# Patient Record
Sex: FEMALE | Race: WHITE | NOT HISPANIC OR LATINO | Employment: OTHER | URBAN - METROPOLITAN AREA
[De-identification: names, ages, dates, MRNs, and addresses within clinical notes are randomized per-mention and may not be internally consistent; named-entity substitution may affect disease eponyms.]

---

## 2017-02-03 ENCOUNTER — CONVERSION ENCOUNTER (OUTPATIENT)
Dept: FAMILY MEDICINE CLINIC | Facility: CLINIC | Age: 82
End: 2017-02-03

## 2017-09-01 ENCOUNTER — CONVERSION ENCOUNTER (OUTPATIENT)
Dept: FAMILY MEDICINE CLINIC | Facility: CLINIC | Age: 82
End: 2017-09-01

## 2018-03-02 ENCOUNTER — CONVERSION ENCOUNTER (OUTPATIENT)
Dept: FAMILY MEDICINE CLINIC | Facility: CLINIC | Age: 83
End: 2018-03-02

## 2018-08-31 ENCOUNTER — CONVERSION ENCOUNTER (OUTPATIENT)
Dept: FAMILY MEDICINE CLINIC | Facility: CLINIC | Age: 83
End: 2018-08-31

## 2019-03-01 ENCOUNTER — CONVERSION ENCOUNTER (OUTPATIENT)
Dept: FAMILY MEDICINE CLINIC | Facility: CLINIC | Age: 84
End: 2019-03-01

## 2019-06-03 VITALS
HEART RATE: 86 BPM | HEIGHT: 60 IN | SYSTOLIC BLOOD PRESSURE: 171 MMHG | SYSTOLIC BLOOD PRESSURE: 182 MMHG | HEART RATE: 61 BPM | WEIGHT: 149 LBS | WEIGHT: 142 LBS | DIASTOLIC BLOOD PRESSURE: 57 MMHG | HEART RATE: 53 BPM | HEIGHT: 60 IN | HEART RATE: 58 BPM | BODY MASS INDEX: 29.25 KG/M2 | DIASTOLIC BLOOD PRESSURE: 80 MMHG | BODY MASS INDEX: 27.88 KG/M2 | WEIGHT: 146 LBS | HEIGHT: 60 IN | DIASTOLIC BLOOD PRESSURE: 81 MMHG | SYSTOLIC BLOOD PRESSURE: 183 MMHG | WEIGHT: 150 LBS | BODY MASS INDEX: 29.45 KG/M2 | BODY MASS INDEX: 28.66 KG/M2 | DIASTOLIC BLOOD PRESSURE: 80 MMHG | DIASTOLIC BLOOD PRESSURE: 78 MMHG | HEIGHT: 60 IN | WEIGHT: 134 LBS | SYSTOLIC BLOOD PRESSURE: 151 MMHG | BODY MASS INDEX: 26.31 KG/M2 | HEART RATE: 51 BPM | HEIGHT: 60 IN | SYSTOLIC BLOOD PRESSURE: 108 MMHG

## 2019-08-26 RX ORDER — AMLODIPINE BESYLATE 5 MG/1
TABLET ORAL
Qty: 60 TABLET | Refills: 4 | Status: SHIPPED | OUTPATIENT
Start: 2019-08-26 | End: 2020-02-26

## 2019-11-14 ENCOUNTER — OFFICE VISIT (OUTPATIENT)
Dept: CARDIOLOGY | Facility: CLINIC | Age: 84
End: 2019-11-14

## 2019-11-14 VITALS
WEIGHT: 134 LBS | BODY MASS INDEX: 26.17 KG/M2 | DIASTOLIC BLOOD PRESSURE: 77 MMHG | SYSTOLIC BLOOD PRESSURE: 151 MMHG | HEART RATE: 66 BPM

## 2019-11-14 DIAGNOSIS — R60.9 EDEMA, UNSPECIFIED TYPE: ICD-10-CM

## 2019-11-14 DIAGNOSIS — N18.30 CHRONIC RENAL INSUFFICIENCY, STAGE III (MODERATE) (HCC): ICD-10-CM

## 2019-11-14 DIAGNOSIS — R06.02 SHORTNESS OF BREATH: ICD-10-CM

## 2019-11-14 DIAGNOSIS — I10 ESSENTIAL HYPERTENSION: Primary | ICD-10-CM

## 2019-11-14 PROCEDURE — 99213 OFFICE O/P EST LOW 20 MIN: CPT | Performed by: NURSE PRACTITIONER

## 2019-11-14 PROCEDURE — 93000 ELECTROCARDIOGRAM COMPLETE: CPT | Performed by: NURSE PRACTITIONER

## 2019-11-14 RX ORDER — MONTELUKAST SODIUM 10 MG/1
TABLET ORAL EVERY 24 HOURS
COMMUNITY

## 2019-11-14 RX ORDER — AMLODIPINE BESYLATE 5 MG/1
TABLET ORAL EVERY 24 HOURS
COMMUNITY
End: 2020-06-24

## 2019-11-14 RX ORDER — CHLORAL HYDRATE 500 MG
CAPSULE ORAL EVERY 24 HOURS
COMMUNITY

## 2019-11-14 RX ORDER — SEVELAMER CARBONATE 800 MG/1
TABLET, FILM COATED ORAL
COMMUNITY

## 2019-11-14 RX ORDER — MELATONIN 10 MG
CAPSULE ORAL
COMMUNITY

## 2019-11-14 RX ORDER — FUROSEMIDE 20 MG/1
5 TABLET ORAL 2 TIMES DAILY
COMMUNITY

## 2019-11-14 RX ORDER — NEBIVOLOL 20 MG/1
TABLET ORAL
COMMUNITY

## 2019-11-14 RX ORDER — GABAPENTIN 800 MG/1
TABLET ORAL EVERY 6 HOURS SCHEDULED
COMMUNITY

## 2019-11-14 NOTE — PROGRESS NOTES
"  [facility] CARDIOLOGY      REASON FOR FOLLOW-UP:  Hypertension  Valvular heart disease      Chief Complaint   Patient presents with   • Hypertension     Routine f/u.  Changing from Dr. Calixto because she doesn't want to go to [place]         Dear Dr. Turcios,    History of Present Illness     It was my pleasure to see Ms. Fajardo in the office today.  As you are aware, she is a very pleasant and highly functioning 84 year-old white female with no known history of occlusive coronary disease.  She does have past medical history significant for hypertension, chronic kidney disease.  In 2014, patient had echocardiogram which showed normal left ventricular size and function and LVEF was 60-65%.  Mild tricuspid and mitral regurgitation was noted. In 2016, patient underwent stress test which was negative for ischemia or myocardial infarction on Cardiolite imaging.  However EKG showed ST depression during pharmacological portion. Medical treatment was recommended.  She has an additional history of chronic kidney disease and is seen by nephrology.  She presents today in follow-up and to establish care in our office.    Today, the patient reports that she feels well.  Specifically, she denies any complaints of chest pain, pressure, tightness or palpitations.  She has occasional shortness of breath with activity or when she \"talks for a long period of time\".  She denies any shortness of air at rest.  She is quite active at her age.  She does have occasional, mild edema of her ankles.  Her blood pressure today is elevated at 151/77, retake 159/75.  The patient states she monitors her pressures at home with consistent systolic readings in the 130s.  She will continue to monitor closely and notify us for any significant changes.         Assessment:  Hypertension  Valvular heart disease  Lupus        Plan:  Continue current medical plan  Continue to monitor pressures at home and notify our office for any " changes  Lipids per your office  Follow-up in 6 months or sooner if needed.        The following portions of the patient's history were reviewed and updated as appropriate: allergies, current medications, past family history, past medical history, past social history, past surgical history and problem list.    REVIEW OF SYSTEMS:    Review of Systems   Cardiovascular: Positive for leg swelling.   Neurological: Positive for dizziness.        History of vertigo   All other systems reviewed and are negative.      Vitals:    19 1313   BP: 151/77   Pulse: 66         PHYSICAL EXAM:    General: Alert, cooperative, no distress, appears stated age  Head:  Normocephalic, atraumatic, mucous membranes moist  Eyes:  Conjunctiva/corneas clear, EOM's intact     Neck:  Supple,  no JVD or bruit     Lungs: Clear to auscultation bilaterally, no wheezes rhonchi rales are noted  Chest wall: No tenderness  Musculoskeletal:   Ambulates freely without assistance  Heart::  Regular rate and rhythm, S1 and S2 normal, no murmur, rub or gallop  Abdomen: Soft, non-tender, nondistended bowel sounds active  Extremities: No cyanosis, clubbing.  1+ edema to ankles   Pulses: 2+ and symmetric all extremities  Skin:  No rashes or lesions  Neuro/psych: A&O x3. CN II through XII are grossly intact with appropriate affect        Past Medical History:   Diagnosis Date   • Hypertension    • Lupus (CMS/HCC)    • Sjogren's syndrome (CMS/HCC)        Past Surgical History:   Procedure Laterality Date   • APPENDECTOMY     • BREAST BIOPSY     •  SECTION     • SINUS SURGERY           Current Outpatient Medications:   •  amLODIPine (NORVASC) 5 MG tablet, TAKE 1 TABLET BY MOUTH TWO TIMES A DAY , Disp: 60 tablet, Rfl: 4  •  amLODIPine (NORVASC) 5 MG tablet, Daily., Disp: , Rfl:   •  furosemide (LASIX) 20 MG tablet, Daily., Disp: , Rfl:   •  gabapentin (NEURONTIN) 800 MG tablet, Every 6 (Six) Hours., Disp: , Rfl:   •  Garlic (GARLIC 1500) 1500 MG capsule,  as directed, Disp: , Rfl:   •  Ginkgo Biloba 400 MG capsule, as directed, Disp: , Rfl:   •  Melatonin 10 MG capsule, 10 capsules throughout the night, Disp: , Rfl:   •  Misc Natural Products (OSTEO BI-FLEX ADV DOUBLE ST PO), as directed, Disp: , Rfl:   •  montelukast (SINGULAIR) 10 MG tablet, Daily., Disp: , Rfl:   •  Multiple Vitamins-Minerals (CENTRUM SILVER ADULT 50+ PO), as directed, Disp: , Rfl:   •  nebivolol (BYSTOLIC) 20 MG tablet, 1 tablet, Disp: , Rfl:   •  Omega-3 Fatty Acids (FISH OIL) 1000 MG capsule capsule, Daily., Disp: , Rfl:   •  sevelamer (RENVELA) 800 MG tablet, TAKE 1 TABLET BY MOUTH THREE TIMES A DAY WITH MEALS, Disp: , Rfl:   •  SUPER B COMPLEX/C PO, as directed, Disp: , Rfl:     Allergies   Allergen Reactions   • Penicillins Swelling and Unknown (See Comments)   • Mushroom Cough       History reviewed. No pertinent family history.    Social History     Tobacco Use   • Smoking status: Never Smoker   • Smokeless tobacco: Never Used   Substance Use Topics   • Alcohol use: No     Frequency: Never           Current Electrocardiogram:    ECG 12 Lead  Date/Time: 11/14/2019 2:04 PM  Performed by: Amanda Talley APRN  Authorized by: Amanda Talley APRN   Comparison: not compared with previous ECG   Rhythm: sinus rhythm  BPM: 66                EMY Bolanos  11/14/19  2:04 PM

## 2020-02-26 RX ORDER — AMLODIPINE BESYLATE 5 MG/1
TABLET ORAL
Qty: 60 TABLET | Refills: 0 | Status: SHIPPED | OUTPATIENT
Start: 2020-02-26 | End: 2020-04-07

## 2020-04-07 RX ORDER — AMLODIPINE BESYLATE 5 MG/1
TABLET ORAL
Qty: 60 TABLET | Refills: 0 | Status: SHIPPED | OUTPATIENT
Start: 2020-04-07 | End: 2020-04-21

## 2020-04-21 RX ORDER — AMLODIPINE BESYLATE 5 MG/1
TABLET ORAL
Qty: 60 TABLET | Refills: 0 | Status: SHIPPED | OUTPATIENT
Start: 2020-04-21 | End: 2020-05-18 | Stop reason: SDUPTHER

## 2020-05-04 RX ORDER — AMLODIPINE BESYLATE 5 MG/1
TABLET ORAL
Qty: 60 TABLET | Refills: 0 | Status: SHIPPED | OUTPATIENT
Start: 2020-05-04 | End: 2020-05-18 | Stop reason: SDUPTHER

## 2020-05-18 ENCOUNTER — OFFICE VISIT (OUTPATIENT)
Dept: CARDIOLOGY | Facility: CLINIC | Age: 85
End: 2020-05-18

## 2020-05-18 VITALS
SYSTOLIC BLOOD PRESSURE: 144 MMHG | WEIGHT: 132 LBS | BODY MASS INDEX: 27.71 KG/M2 | HEART RATE: 56 BPM | OXYGEN SATURATION: 96 % | HEIGHT: 58 IN | DIASTOLIC BLOOD PRESSURE: 71 MMHG

## 2020-05-18 DIAGNOSIS — N18.30 CHRONIC RENAL INSUFFICIENCY, STAGE III (MODERATE) (HCC): ICD-10-CM

## 2020-05-18 DIAGNOSIS — I10 ESSENTIAL HYPERTENSION: Primary | ICD-10-CM

## 2020-05-18 DIAGNOSIS — R60.0 LOCALIZED EDEMA: ICD-10-CM

## 2020-05-18 PROCEDURE — 99213 OFFICE O/P EST LOW 20 MIN: CPT | Performed by: NURSE PRACTITIONER

## 2020-05-18 PROCEDURE — 93000 ELECTROCARDIOGRAM COMPLETE: CPT | Performed by: NURSE PRACTITIONER

## 2020-05-18 RX ORDER — CYCLOBENZAPRINE HCL 10 MG
10 TABLET ORAL 3 TIMES DAILY PRN
COMMUNITY

## 2020-05-18 NOTE — PROGRESS NOTES
"  [facility] CARDIOLOGY      REASON FOR FOLLOW-UP:  Hypertension        Chief Complaint   Patient presents with   • Hypertension     6 mo f/u no cardiac complaints          Dear Dr. Turcios,      History of Present Illness     It was my pleasure to see Ms. Fajardo in the office today.  As you are aware, she is a very pleasant 84-year-old  female with no known history of occlusive coronary disease.  She does have past medical history significant for hypertension, chronic kidney diseas for which she sees Dr. Henderson.  In 2014, patient had echocardiogram which showed normal left ventricular size and function and LVEF was 60-65%.  Mild tricuspid and mitral regurgitation was noted. In 2016, patient underwent stress test which was negative for ischemia or myocardial infarction on Cardiolite imaging.  However EKG showed ST depression during pharmacological portion. Medical treatment was recommended.  She has an additional history of chronic kidney disease and is seen by nephrology.  She presents today in follow-up for hypertension.    Today, the patient reports that she is doing well.  Specifically, she denies any chest discomfort, shortness of breath, dizziness or lightheadedness.  Patient does have some mild chronic lower extremity edema that is controlled.  Blood pressure in the office today is 144/71.  EKG shows sinus bradycardia with rate of 56.  She denies any presyncopal or syncopal episodes.  Patient was recently seen by nephrology and labs were performed at [facility].  Her only complaint today is lower extremity weakness and occasionally her legs \"give out\".    Assessment:  Hypertension  Chronic kidney disease  Leg weakness        Plan:  We will try to obtain labs from [place]  Follow-up our office in 6 months  Encourage patient to use walking assistive device to prevent falls/injury        The following portions of the patient's history were reviewed and updated as appropriate: " allergies, current medications, past family history, past medical history, past social history, past surgical history and problem list.    REVIEW OF SYSTEMS:    Review of Systems   Neurological: Positive for focal weakness.        Lower extremity weakness   All other systems reviewed and are negative.      Vitals:    20 1411   BP: 144/71   Pulse: 56   SpO2: 96%         PHYSICAL EXAM:    General: Alert, cooperative, no distress, appears stated age  Head:  Normocephalic, atraumatic, mucous membranes moist  Eyes:  Conjunctiva/corneas clear, EOM's intact     Neck:  Supple,  no JVD or bruit     Lungs: Clear to auscultation bilaterally, no wheezes rhonchi rales are noted  Chest wall: No tenderness  Musculoskeletal:   Ambulates freely with slow, shuffling gait  Heart::  Regular rate and rhythm, S1 and S2 normal, no murmur, rub or gallop  Abdomen: Soft, non-tender, nondistended bowel sounds active, no abdominal bruit  Extremities: No cyanosis, clubbing, or edema   Pulses: 2+ and symmetric all extremities  Skin:  No rashes or lesions  Neuro/psych: A&O x3. CN II through XII are grossly intact with appropriate affect        Past Medical History:   Diagnosis Date   • Hypertension    • Lupus (CMS/HCC)    • Sjogren's syndrome (CMS/HCC)        Past Surgical History:   Procedure Laterality Date   • APPENDECTOMY     • BREAST BIOPSY     •  SECTION     • SINUS SURGERY           Current Outpatient Medications:   •  cyclobenzaprine (FLEXERIL) 10 MG tablet, Take 10 mg by mouth 3 (Three) Times a Day As Needed for Muscle Spasms., Disp: , Rfl:   •  furosemide (LASIX) 20 MG tablet, 40 mg Daily., Disp: , Rfl:   •  gabapentin (NEURONTIN) 800 MG tablet, Every 6 (Six) Hours., Disp: , Rfl:   •  Garlic (GARLIC 1500) 1500 MG capsule, as directed, Disp: , Rfl:   •  Ginkgo Biloba 400 MG capsule, as directed, Disp: , Rfl:   •  Melatonin 10 MG capsule, 10 capsules throughout the night, Disp: , Rfl:   •  Misc Natural Products (OSTEO  "BI-FLEX ADV DOUBLE ST PO), as directed, Disp: , Rfl:   •  montelukast (SINGULAIR) 10 MG tablet, Daily., Disp: , Rfl:   •  Multiple Vitamins-Minerals (CENTRUM SILVER ADULT 50+ PO), as directed, Disp: , Rfl:   •  nebivolol (BYSTOLIC) 20 MG tablet, 1 tablet, Disp: , Rfl:   •  Omega-3 Fatty Acids (FISH OIL) 1000 MG capsule capsule, Daily., Disp: , Rfl:   •  sevelamer (RENVELA) 800 MG tablet, TAKE 1 TABLET BY MOUTH THREE TIMES A DAY WITH MEALS, Disp: , Rfl:   •  SUPER B COMPLEX/C PO, as directed, Disp: , Rfl:   •  amLODIPine (NORVASC) 5 MG tablet, Daily., Disp: , Rfl:     Allergies   Allergen Reactions   • Penicillins Swelling and Unknown (See Comments)   • Mushroom Cough       History reviewed. No pertinent family history.    Social History     Tobacco Use   • Smoking status: Never Smoker   • Smokeless tobacco: Never Used   Substance Use Topics   • Alcohol use: No     Frequency: Never           Current Electrocardiogram:    ECG 12 Lead  Date/Time: 5/18/2020 2:32 PM  Performed by: Amanda Talley APRN  Authorized by: Amanda Talley APRN   Comparison: not compared with previous ECG   Rhythm: sinus bradycardia  BPM: 56  Conduction comments: Nonspecific T wave changes anteriorly                EMY Bolanos  05/18/20  14:33      EMR Dragon/Transcription:   \"Dictated utilizing Dragon dictation\".         "

## 2020-05-18 NOTE — PROGRESS NOTES
Encounter Date:  05/18/2020    Patient ID:   Elsie Fajardo is a 84 y.o. female.    Reason For Followup:  l    Brief Clinical History:  Dear Dr. Turcios, Owen JACKSON MD    I had the pleasure of performing a telephone visit with Elsie Fajardo today. As you are well aware, this is a 84 y.o. female with no known history of occlusive coronary disease.  She does have past medical history significant for hypertension, chronic kidney disease.  In 2014, patient had echocardiogram which showed normal left ventricular size and function and LVEF was 60-65%.  Mild tricuspid and mitral regurgitation was noted. In 2016, patient underwent stress test which was negative for ischemia or myocardial infarction on Cardiolite imaging.  However EKG showed ST depression during pharmacological portion. Medical treatment was recommended.  She has an additional history of chronic kidney disease and is seen by nephrology.  She presents today in follow-up and to establish care in our office.    Interval History:  l    ?  Labs    Assessment & Plan    Impressions:  l    Recommendations:  l    Vitals:  There were no vitals filed for this visit.    Allergies:  Allergies   Allergen Reactions   • Penicillins Swelling and Unknown (See Comments)   • Mushroom Cough       Medication Review:     Current Outpatient Medications:   •  amLODIPine (NORVASC) 5 MG tablet, Daily., Disp: , Rfl:   •  amLODIPine (NORVASC) 5 MG tablet, TAKE 1 TABLET BY MOUTH TWO TIMES A DAY , Disp: 60 tablet, Rfl: 0  •  amLODIPine (NORVASC) 5 MG tablet, TAKE 1 TABLET BY MOUTH TWO TIMES A DAY , Disp: 60 tablet, Rfl: 0  •  furosemide (LASIX) 20 MG tablet, Daily., Disp: , Rfl:   •  gabapentin (NEURONTIN) 800 MG tablet, Every 6 (Six) Hours., Disp: , Rfl:   •  Garlic (GARLIC 1500) 1500 MG capsule, as directed, Disp: , Rfl:   •  Ginkgo Biloba 400 MG capsule, as directed, Disp: , Rfl:   •  Melatonin 10 MG capsule, 10 capsules throughout the night, Disp: , Rfl:   •  Misc Natural Products  (OSTEO BI-FLEX ADV DOUBLE ST PO), as directed, Disp: , Rfl:   •  montelukast (SINGULAIR) 10 MG tablet, Daily., Disp: , Rfl:   •  Multiple Vitamins-Minerals (CENTRUM SILVER ADULT 50+ PO), as directed, Disp: , Rfl:   •  nebivolol (BYSTOLIC) 20 MG tablet, 1 tablet, Disp: , Rfl:   •  Omega-3 Fatty Acids (FISH OIL) 1000 MG capsule capsule, Daily., Disp: , Rfl:   •  sevelamer (RENVELA) 800 MG tablet, TAKE 1 TABLET BY MOUTH THREE TIMES A DAY WITH MEALS, Disp: , Rfl:   •  SUPER B COMPLEX/C PO, as directed, Disp: , Rfl:     Family History:  No family history on file.    Past Medical History:  Past Medical History:   Diagnosis Date   • Hypertension    • Lupus (CMS/HCC)    • Sjogren's syndrome (CMS/HCC)        Past surgical History:  Past Surgical History:   Procedure Laterality Date   • APPENDECTOMY     • BREAST BIOPSY     •  SECTION     • SINUS SURGERY         Social History:  Social History     Socioeconomic History   • Marital status:      Spouse name: Not on file   • Number of children: Not on file   • Years of education: Not on file   • Highest education level: Not on file   Tobacco Use   • Smoking status: Never Smoker   • Smokeless tobacco: Never Used   Substance and Sexual Activity   • Alcohol use: No     Frequency: Never   • Drug use: No       Review of Systems:  The following systems were reviewed as they relate to the cardiovascular system: Constitutional, Eyes, ENT, Cardiovascular, Respiratory, Gastrointestinal, Integumentary, Neurological, Psychiatric, Hematologic, Endocrine, Musculoskeletal, and Genitourinary. The pertinent cardiovascular findings are reported above with all other cardiovascular points within those systems being negative.    Diagnostic Study Review:     Current Electrocardiogram:  Procedures      NOTE: The following portions of the patient's history were reviewed and updated this visit as appropriate: allergies, current medications, past family history, past medical history, past  "social history, past surgical history and problem list.    A total of 1 minutes of medical discussion occurred during this encounter.      Novel Coronavirus (COVID-19) Disclaimer:     A proclamation declaring a national emergency concerning the Novel Coronavirus Disease (COVID-19) Outbreak was issued on March 13, 2020 at the direction of the .    This virtual visit was performed with the patient's consent in lieu of the patient's regularly scheduled appointment in order to provide the patient with the opportunity to maintain contact with their healthcare provider while also adhering to social distancing guidelines set forth by the CDC to reduce exposure to the Novel Coronavirus (COVID-19).  Any vital signs obtained during this visit were provided by the patient.    EMR Dragon/Transcription:   \"Dictated utilizing Dragon dictation\".   "

## 2020-06-24 RX ORDER — AMLODIPINE BESYLATE 5 MG/1
TABLET ORAL
Qty: 60 TABLET | Refills: 0 | Status: SHIPPED | OUTPATIENT
Start: 2020-06-24 | End: 2020-07-21

## 2020-07-21 RX ORDER — AMLODIPINE BESYLATE 5 MG/1
TABLET ORAL
Qty: 60 TABLET | Refills: 0 | Status: SHIPPED | OUTPATIENT
Start: 2020-07-21 | End: 2020-08-20

## 2020-08-20 RX ORDER — AMLODIPINE BESYLATE 5 MG/1
TABLET ORAL
Qty: 60 TABLET | Refills: 0 | Status: SHIPPED | OUTPATIENT
Start: 2020-08-20 | End: 2020-08-24

## 2020-08-24 RX ORDER — AMLODIPINE BESYLATE 5 MG/1
TABLET ORAL
Qty: 60 TABLET | Refills: 0 | Status: SHIPPED | OUTPATIENT
Start: 2020-08-24 | End: 2020-10-19

## 2020-10-20 RX ORDER — AMLODIPINE BESYLATE 5 MG/1
TABLET ORAL
Qty: 60 TABLET | Refills: 2 | Status: SHIPPED | OUTPATIENT
Start: 2020-10-20 | End: 2021-02-22

## 2020-12-01 ENCOUNTER — OFFICE VISIT (OUTPATIENT)
Dept: CARDIOLOGY | Facility: CLINIC | Age: 85
End: 2020-12-01

## 2020-12-01 VITALS
RESPIRATION RATE: 18 BRPM | HEART RATE: 47 BPM | SYSTOLIC BLOOD PRESSURE: 147 MMHG | BODY MASS INDEX: 28.34 KG/M2 | WEIGHT: 135 LBS | DIASTOLIC BLOOD PRESSURE: 69 MMHG | HEIGHT: 58 IN | OXYGEN SATURATION: 99 %

## 2020-12-01 DIAGNOSIS — I10 ESSENTIAL HYPERTENSION: Primary | ICD-10-CM

## 2020-12-01 DIAGNOSIS — N18.30 CHRONIC RENAL IMPAIRMENT, STAGE 3 (MODERATE), UNSPECIFIED WHETHER STAGE 3A OR 3B CKD (HCC): ICD-10-CM

## 2020-12-01 DIAGNOSIS — R60.0 LOCALIZED EDEMA: ICD-10-CM

## 2020-12-01 DIAGNOSIS — R06.09 DYSPNEA ON EXERTION: ICD-10-CM

## 2020-12-01 PROCEDURE — 93000 ELECTROCARDIOGRAM COMPLETE: CPT | Performed by: NURSE PRACTITIONER

## 2020-12-01 PROCEDURE — 99213 OFFICE O/P EST LOW 20 MIN: CPT | Performed by: NURSE PRACTITIONER

## 2020-12-01 RX ORDER — MELOXICAM 15 MG/1
15 TABLET ORAL DAILY
COMMUNITY
Start: 2020-11-17

## 2020-12-01 RX ORDER — CALCITRIOL 0.25 UG/1
CAPSULE, LIQUID FILLED ORAL
COMMUNITY
Start: 2016-06-24 | End: 2020-12-01

## 2020-12-01 RX ORDER — TEMAZEPAM 30 MG/1
CAPSULE ORAL
COMMUNITY
Start: 2020-11-12

## 2020-12-01 RX ORDER — MULTIVIT-MIN/IRON/FOLIC ACID/K 18-600-40
1 CAPSULE ORAL EVERY 24 HOURS
COMMUNITY
Start: 2019-03-01

## 2020-12-01 RX ORDER — LEVOFLOXACIN 500 MG/1
500 TABLET, FILM COATED ORAL EVERY OTHER DAY
COMMUNITY
Start: 2020-10-23 | End: 2020-12-01

## 2020-12-01 RX ORDER — NICOTINE POLACRILEX 4 MG/1
GUM, CHEWING ORAL
COMMUNITY
Start: 2016-06-24

## 2020-12-01 NOTE — PROGRESS NOTES
[facility] CARDIOLOGY      REASON FOR FOLLOW-UP:  Hypertension  Lower extremity edema        Chief Complaint   Patient presents with   • Hypertension     6 month follow up no cardiac complaints         Dear Dr. Turcios,    History of Present Illness     It was my pleasure to see Ms. Fajardo in the office today.  As you are aware, she is a very pleasant 85-year-old  female with no known history of occlusive coronary disease.  She does have past medical history significant for hypertension, chronic kidney diseas for which she sees Dr. Henderson.  In 2014, patient had echocardiogram which showed normal left ventricular size and function and LVEF was 60-65%.  Mild tricuspid and mitral regurgitation was noted. In 2016, patient underwent stress test which was negative for ischemia or myocardial infarction on Cardiolite imaging.  However EKG showed ST depression during pharmacological portion. Medical treatment was recommended.  She has an additional history of chronic kidney disease and is seen by nephrology.  She presents today in follow-up for hypertension.     Today, the patient reports that she is doing well.  Specifically, she denies any chest discomfort, shortness of breath, dizziness or lightheadedness.  Patient does have some mild chronic lower extremity edema that is controlled.    She is currently wearing compression stockings.  Blood pressure in the office today is 147/69.  EKG shows sinus bradycardia with rate of 47.  She denies any presyncopal or syncopal episodes.  Patient was recently seen by nephrology and labs were performed at [facility].    Labs 5/18/2020 with creatinine 2.0.  We discussed options for improving her blood pressure.  I will increase amlodipine to 10 mg daily and she will monitor lower extremity edema closely, report to us with any worsening.  She is followed closely by her nephrologist-Dr. Henderson.       Assessment:  Hypertension  Chronic kidney  disease  Leg weakness           Plan:  Increase amlodipine to 10 mg once daily  Follow-up our office in 6 months  Encourage patient to use walking assistive device to prevent falls/injury    The following portions of the patient's history were reviewed and updated as appropriate: allergies, current medications, past family history, past medical history, past social history, past surgical history and problem list.    REVIEW OF SYSTEMS:    Review of Systems   Cardiovascular: Positive for leg swelling.   All other systems reviewed and are negative.      Vitals:    20 1504   BP: 147/69   Pulse: (!) 47   Resp: 18   SpO2: 99%         PHYSICAL EXAM:    General: Well-developed, well-nourished 85-year-old  female who is alert, cooperative, no distress, appears stated age  Head:  Normocephalic, atraumatic, mucous membranes moist  Eyes:  Conjunctiva/corneas clear, EOM's intact     Neck:  Supple,  no JVD or bruit     Lungs: Clear to auscultation bilaterally, no wheezes rhonchi rales are noted  Chest wall: No tenderness  Musculoskeletal:   Ambulates slowly with assistance of a cane  Heart::  Regular rate and rhythm, S1 and S2 normal, no murmur, rub or gallop  Abdomen: Soft, non-tender, nondistended, bowel sounds active, no abdominal bruit  Extremities: No cyanosis, clubbing.  Compression to lower extremities  Pulses: 2+ and symmetric all extremities  Skin:  No rashes or lesions  Neuro/psych: A&O x3. CN II through XII are grossly intact with appropriate affect        Past Medical History:   Diagnosis Date   • Hypertension    • Lupus (CMS/HCC)    • Sjogren's syndrome (CMS/HCC)        Past Surgical History:   Procedure Laterality Date   • APPENDECTOMY     • BREAST BIOPSY     •  SECTION     • SINUS SURGERY           Current Outpatient Medications:   •  amLODIPine (NORVASC) 5 MG tablet, TAKE 1 TABLET BY MOUTH TWO TIMES A DAY , Disp: 60 tablet, Rfl: 2  •  Cholecalciferol (Vitamin D) 50 MCG (2000) capsule, 1  capsule Daily., Disp: , Rfl:   •  cyclobenzaprine (FLEXERIL) 10 MG tablet, Take 10 mg by mouth 3 (Three) Times a Day As Needed for Muscle Spasms., Disp: , Rfl:   •  furosemide (LASIX) 20 MG tablet, 5 mg 2 (Two) Times a Day., Disp: , Rfl:   •  gabapentin (NEURONTIN) 800 MG tablet, Every 6 (Six) Hours., Disp: , Rfl:   •  Garlic (GARLIC 1500) 1500 MG capsule, as directed, Disp: , Rfl:   •  Ginkgo Biloba 400 MG capsule, as directed, Disp: , Rfl:   •  Melatonin 10 MG capsule, 10 capsules throughout the night, Disp: , Rfl:   •  meloxicam (MOBIC) 15 MG tablet, Take 15 mg by mouth Daily. 200001, Disp: , Rfl:   •  Misc Natural Products (OSTEO BI-FLEX ADV DOUBLE ST PO), as directed, Disp: , Rfl:   •  montelukast (SINGULAIR) 10 MG tablet, Daily., Disp: , Rfl:   •  Multiple Vitamins-Minerals (CENTRUM SILVER ADULT 50+ PO), as directed, Disp: , Rfl:   •  nebivolol (BYSTOLIC) 20 MG tablet, 1 tablet, Disp: , Rfl:   •  Omega-3 Fatty Acids (FISH OIL) 1000 MG capsule capsule, Daily., Disp: , Rfl:   •  Omeprazole (EQ Omeprazole) 20 MG tablet delayed-release, EQ OMEPRAZOLE 20 MG TBEC, Disp: , Rfl:   •  sevelamer (RENVELA) 800 MG tablet, TAKE 1 TABLET BY MOUTH THREE TIMES A DAY WITH MEALS, Disp: , Rfl:   •  SUPER B COMPLEX/C PO, as directed, Disp: , Rfl:   •  temazepam (RESTORIL) 30 MG capsule, , Disp: , Rfl:     Allergies   Allergen Reactions   • Penicillins Swelling and Unknown (See Comments)   • Mushroom Cough       History reviewed. No pertinent family history.    Social History     Tobacco Use   • Smoking status: Never Smoker   • Smokeless tobacco: Never Used   Substance Use Topics   • Alcohol use: No     Frequency: Never           Current Electrocardiogram:    ECG 12 Lead    Date/Time: 12/1/2020 1:13 PM  Performed by: Amanda Talley APRN  Authorized by: Amanda Talley APRN   Comparison: not compared with previous ECG   Rhythm: sinus rhythm and sinus bradycardia  BPM: 47                  EMR Dragon/Transcription:  "  \"Dictated utilizing Dragon dictation\".         "

## 2021-01-21 RX ORDER — AMLODIPINE BESYLATE 5 MG/1
TABLET ORAL
Qty: 60 TABLET | Refills: 0 | OUTPATIENT
Start: 2021-01-21

## 2021-02-22 RX ORDER — AMLODIPINE BESYLATE 5 MG/1
TABLET ORAL
Qty: 60 TABLET | Refills: 0 | Status: SHIPPED | OUTPATIENT
Start: 2021-02-22 | End: 2021-03-01

## 2021-02-22 RX ORDER — AMLODIPINE BESYLATE 5 MG/1
TABLET ORAL
Qty: 60 TABLET | Refills: 0 | OUTPATIENT
Start: 2021-02-22

## 2021-03-01 ENCOUNTER — OFFICE VISIT (OUTPATIENT)
Dept: CARDIOLOGY | Facility: CLINIC | Age: 86
End: 2021-03-01

## 2021-03-01 VITALS
SYSTOLIC BLOOD PRESSURE: 118 MMHG | OXYGEN SATURATION: 94 % | WEIGHT: 134 LBS | DIASTOLIC BLOOD PRESSURE: 71 MMHG | BODY MASS INDEX: 28.01 KG/M2 | HEART RATE: 84 BPM

## 2021-03-01 DIAGNOSIS — R60.0 LOCALIZED EDEMA: ICD-10-CM

## 2021-03-01 DIAGNOSIS — R06.09 DYSPNEA ON EXERTION: ICD-10-CM

## 2021-03-01 DIAGNOSIS — N18.30 CHRONIC RENAL IMPAIRMENT, STAGE 3 (MODERATE), UNSPECIFIED WHETHER STAGE 3A OR 3B CKD (HCC): ICD-10-CM

## 2021-03-01 DIAGNOSIS — I10 ESSENTIAL HYPERTENSION: Primary | ICD-10-CM

## 2021-03-01 PROCEDURE — 99213 OFFICE O/P EST LOW 20 MIN: CPT | Performed by: NURSE PRACTITIONER

## 2021-03-01 RX ORDER — AMLODIPINE BESYLATE 10 MG/1
10 TABLET ORAL DAILY
Qty: 30 TABLET | Refills: 11 | COMMUNITY
Start: 2021-03-01

## 2021-03-01 NOTE — PROGRESS NOTES
[facility] CARDIOLOGY      REASON FOR FOLLOW-UP:  Hypertension  Lower extremity edema          Chief Complaint   Patient presents with   • Hypertension     3 mo f/u          Dear Dr. Turcios,    History of Present Illness     It was my pleasure to see Ms. Fajardo in the office today.  As you are aware, she is a very pleasant 85-year-old  female with no known history of occlusive coronary disease.  She does have past medical history significant for hypertension, chronic kidney diseas for which she sees Dr. Henderson.  In 2014, patient had echocardiogram which showed normal left ventricular size and function and LVEF was 60-65%.  Mild tricuspid and mitral regurgitation was noted. In 2016, patient underwent stress test which was negative for ischemia or myocardial infarction on Cardiolite imaging.  However EKG showed ST depression during pharmacological portion. Medical treatment was recommended.  She has an additional history of chronic kidney disease and is seen by nephrology.  She presents today in follow-up for hypertension.     Today, the patient reports that she is doing well.  Specifically, she denies any chest discomfort, shortness of breath, dizziness or lightheadedness.  Patient does have some mild chronic musculoskeletal discomfort. Blood pressure in the office today is  much improved at 118/71.  She denies any presyncopal or syncopal episodes.  She denies any worsening of her lower extremity edema       Assessment:  Hypertension  Chronic kidney disease  Leg weakness    Plan:  Continue current medical plan  Follow-up in 6 months or sooner if needed  Lipids your office        The following portions of the patient's history were reviewed and updated as appropriate: allergies, current medications, past family history, past medical history, past social history, past surgical history and problem list.    REVIEW OF SYSTEMS:    Review of Systems   Musculoskeletal: Positive for joint  pain and joint swelling.        Osteoarthritis   All other systems reviewed and are negative.      Vitals:    21 1507   BP: 118/71   Pulse: 84   SpO2: 94%         PHYSICAL EXAM:    General: Alert, cooperative, no distress, appears stated age  Head:  Normocephalic, atraumatic, mucous membranes moist  Eyes:  Conjunctiva/corneas clear, EOM's intact     Neck:  Supple,  no JVD or bruit     Lungs: Clear to auscultation bilaterally, no wheezes rhonchi rales are noted  Chest wall: No tenderness  Musculoskeletal:   Ambulates freely without assistance  Heart::  Regular rate and rhythm, S1 and S2 normal, no murmur, rub or gallop  Abdomen: Soft, non-tender, nondistended, bowel sounds active, no abdominal bruit  Extremities: No cyanosis, clubbing, or edema   Pulses: 2+ and symmetric all extremities  Skin:  No rashes or lesions  Neuro/psych: A&O x3. CN II through XII are grossly intact with appropriate affect        Past Medical History:   Diagnosis Date   • Hypertension    • Lupus (CMS/HCC)    • Sjogren's syndrome (CMS/HCC)        Past Surgical History:   Procedure Laterality Date   • APPENDECTOMY     • BREAST BIOPSY     •  SECTION     • SINUS SURGERY           Current Outpatient Medications:   •  amLODIPine (NORVASC) 10 MG tablet, Take 1 tablet by mouth Daily., Disp: 30 tablet, Rfl: 11  •  Cholecalciferol (Vitamin D) 50 MCG ( UT) capsule, 1 capsule Daily., Disp: , Rfl:   •  cyclobenzaprine (FLEXERIL) 10 MG tablet, Take 10 mg by mouth 3 (Three) Times a Day As Needed for Muscle Spasms., Disp: , Rfl:   •  furosemide (LASIX) 20 MG tablet, 5 mg 2 (Two) Times a Day., Disp: , Rfl:   •  gabapentin (NEURONTIN) 800 MG tablet, Every 6 (Six) Hours., Disp: , Rfl:   •  Garlic (GARLIC 1500) 1500 MG capsule, as directed, Disp: , Rfl:   •  Ginkgo Biloba 400 MG capsule, as directed, Disp: , Rfl:   •  Melatonin 10 MG capsule, 10 capsules throughout the night, Disp: , Rfl:   •  meloxicam (MOBIC) 15 MG tablet, Take 15 mg by mouth  "Daily. 200001, Disp: , Rfl:   •  Misc Natural Products (OSTEO BI-FLEX ADV DOUBLE ST PO), as directed, Disp: , Rfl:   •  montelukast (SINGULAIR) 10 MG tablet, Daily., Disp: , Rfl:   •  Multiple Vitamins-Minerals (CENTRUM SILVER ADULT 50+ PO), as directed, Disp: , Rfl:   •  nebivolol (BYSTOLIC) 20 MG tablet, 1 tablet, Disp: , Rfl:   •  Omega-3 Fatty Acids (FISH OIL) 1000 MG capsule capsule, Daily., Disp: , Rfl:   •  Omeprazole (EQ Omeprazole) 20 MG tablet delayed-release, EQ OMEPRAZOLE 20 MG TBEC, Disp: , Rfl:   •  sevelamer (RENVELA) 800 MG tablet, TAKE 1 TABLET BY MOUTH THREE TIMES A DAY WITH MEALS, Disp: , Rfl:   •  SUPER B COMPLEX/C PO, as directed, Disp: , Rfl:   •  temazepam (RESTORIL) 30 MG capsule, , Disp: , Rfl:     Allergies   Allergen Reactions   • Penicillins Swelling and Unknown (See Comments)   • Mushroom Cough       History reviewed. No pertinent family history.    Social History     Tobacco Use   • Smoking status: Never Smoker   • Smokeless tobacco: Never Used   Substance Use Topics   • Alcohol use: No     Frequency: Never           Current Electrocardiogram:  Procedures        EMR Dragon/Transcription:   \"Dictated utilizing Dragon dictation\".     Disclaimer: Please note that areas of this note were completed with computer voice recognition software.  Quite often unanticipated grammatical, syntax, homophones, and other interpretive errors are inadvertently transcribed by the computer software. Please excuse any errors that have escaped final proofreading    "